# Patient Record
(demographics unavailable — no encounter records)

---

## 2025-02-24 NOTE — BIRTH HISTORY
[At Term] : at term [ Section] : by  section [FreeTextEntry5] : gestational diabetes and fibroids [FreeTextEntry3] : NICU stay denied